# Patient Record
Sex: MALE | Race: OTHER | HISPANIC OR LATINO | URBAN - METROPOLITAN AREA
[De-identification: names, ages, dates, MRNs, and addresses within clinical notes are randomized per-mention and may not be internally consistent; named-entity substitution may affect disease eponyms.]

---

## 2020-05-07 ENCOUNTER — EMERGENCY (EMERGENCY)
Facility: HOSPITAL | Age: 20
LOS: 0 days | Discharge: OTHER INSTITUTION | End: 2020-05-08
Attending: EMERGENCY MEDICINE
Payer: COMMERCIAL

## 2020-05-07 VITALS
WEIGHT: 179.9 LBS | OXYGEN SATURATION: 98 % | HEART RATE: 89 BPM | RESPIRATION RATE: 20 BRPM | SYSTOLIC BLOOD PRESSURE: 169 MMHG | HEIGHT: 72 IN | TEMPERATURE: 99 F | DIASTOLIC BLOOD PRESSURE: 69 MMHG

## 2020-05-07 DIAGNOSIS — F32.89 OTHER SPECIFIED DEPRESSIVE EPISODES: ICD-10-CM

## 2020-05-07 DIAGNOSIS — Z81.1 FAMILY HISTORY OF ALCOHOL ABUSE AND DEPENDENCE: ICD-10-CM

## 2020-05-07 DIAGNOSIS — F12.90 CANNABIS USE, UNSPECIFIED, UNCOMPLICATED: ICD-10-CM

## 2020-05-07 DIAGNOSIS — T71.162A ASPHYXIATION DUE TO HANGING, INTENTIONAL SELF-HARM, INITIAL ENCOUNTER: ICD-10-CM

## 2020-05-07 DIAGNOSIS — Z11.59 ENCOUNTER FOR SCREENING FOR OTHER VIRAL DISEASES: ICD-10-CM
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
## 2020-05-07 DIAGNOSIS — J45.909 UNSPECIFIED ASTHMA, UNCOMPLICATED: ICD-10-CM
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
## 2020-05-07 DIAGNOSIS — S10.91XA ABRASION OF UNSPECIFIED PART OF NECK, INITIAL ENCOUNTER: ICD-10-CM

## 2020-05-07 LAB
ALBUMIN SERPL ELPH-MCNC: 4.1 G/DL — SIGNIFICANT CHANGE UP (ref 3.3–5)
ALP SERPL-CCNC: 62 U/L — SIGNIFICANT CHANGE UP (ref 40–120)
ALT FLD-CCNC: 30 U/L — SIGNIFICANT CHANGE UP (ref 12–78)
ANION GAP SERPL CALC-SCNC: 8 MMOL/L — SIGNIFICANT CHANGE UP (ref 5–17)
APAP SERPL-MCNC: < 2 UG/ML (ref 10–30)
AST SERPL-CCNC: 14 U/L — LOW (ref 15–37)
BASOPHILS # BLD AUTO: 0.07 K/UL — SIGNIFICANT CHANGE UP (ref 0–0.2)
BASOPHILS NFR BLD AUTO: 0.6 % — SIGNIFICANT CHANGE UP (ref 0–2)
BILIRUB SERPL-MCNC: 0.4 MG/DL — SIGNIFICANT CHANGE UP (ref 0.2–1.2)
BUN SERPL-MCNC: 12 MG/DL — SIGNIFICANT CHANGE UP (ref 7–23)
CALCIUM SERPL-MCNC: 9.2 MG/DL — SIGNIFICANT CHANGE UP (ref 8.5–10.1)
CHLORIDE SERPL-SCNC: 105 MMOL/L — SIGNIFICANT CHANGE UP (ref 96–108)
CO2 SERPL-SCNC: 23 MMOL/L — SIGNIFICANT CHANGE UP (ref 22–31)
CREAT SERPL-MCNC: 0.79 MG/DL — SIGNIFICANT CHANGE UP (ref 0.5–1.3)
EOSINOPHIL # BLD AUTO: 0.09 K/UL — SIGNIFICANT CHANGE UP (ref 0–0.5)
EOSINOPHIL NFR BLD AUTO: 0.8 % — SIGNIFICANT CHANGE UP (ref 0–6)
ETHANOL SERPL-MCNC: <10 MG/DL — SIGNIFICANT CHANGE UP (ref 0–10)
GLUCOSE SERPL-MCNC: 96 MG/DL — SIGNIFICANT CHANGE UP (ref 70–99)
HCT VFR BLD CALC: 41.1 % — SIGNIFICANT CHANGE UP (ref 39–50)
HGB BLD-MCNC: 13.9 G/DL — SIGNIFICANT CHANGE UP (ref 13–17)
IMM GRANULOCYTES NFR BLD AUTO: 0.2 % — SIGNIFICANT CHANGE UP (ref 0–1.5)
LYMPHOCYTES # BLD AUTO: 36 % — SIGNIFICANT CHANGE UP (ref 13–44)
LYMPHOCYTES # BLD AUTO: 4 K/UL — HIGH (ref 1–3.3)
MCHC RBC-ENTMCNC: 27.4 PG — SIGNIFICANT CHANGE UP (ref 27–34)
MCHC RBC-ENTMCNC: 33.8 GM/DL — SIGNIFICANT CHANGE UP (ref 32–36)
MCV RBC AUTO: 80.9 FL — SIGNIFICANT CHANGE UP (ref 80–100)
MONOCYTES # BLD AUTO: 0.55 K/UL — SIGNIFICANT CHANGE UP (ref 0–0.9)
MONOCYTES NFR BLD AUTO: 4.9 % — SIGNIFICANT CHANGE UP (ref 2–14)
NEUTROPHILS # BLD AUTO: 6.39 K/UL — SIGNIFICANT CHANGE UP (ref 1.8–7.4)
NEUTROPHILS NFR BLD AUTO: 57.5 % — SIGNIFICANT CHANGE UP (ref 43–77)
PLATELET # BLD AUTO: 268 K/UL — SIGNIFICANT CHANGE UP (ref 150–400)
POTASSIUM SERPL-MCNC: 3.6 MMOL/L — SIGNIFICANT CHANGE UP (ref 3.5–5.3)
POTASSIUM SERPL-SCNC: 3.6 MMOL/L — SIGNIFICANT CHANGE UP (ref 3.5–5.3)
PROT SERPL-MCNC: 7.8 GM/DL — SIGNIFICANT CHANGE UP (ref 6–8.3)
RBC # BLD: 5.08 M/UL — SIGNIFICANT CHANGE UP (ref 4.2–5.8)
RBC # FLD: 11.9 % — SIGNIFICANT CHANGE UP (ref 10.3–14.5)
SALICYLATES SERPL-MCNC: <1.7 MG/DL — LOW (ref 2.8–20)
SODIUM SERPL-SCNC: 136 MMOL/L — SIGNIFICANT CHANGE UP (ref 135–145)
WBC # BLD: 11.12 K/UL — HIGH (ref 3.8–10.5)
WBC # FLD AUTO: 11.12 K/UL — HIGH (ref 3.8–10.5)

## 2020-05-07 PROCEDURE — 99285 EMERGENCY DEPT VISIT HI MDM: CPT

## 2020-05-07 PROCEDURE — 36415 COLL VENOUS BLD VENIPUNCTURE: CPT

## 2020-05-07 PROCEDURE — 80307 DRUG TEST PRSMV CHEM ANLYZR: CPT

## 2020-05-07 PROCEDURE — 80053 COMPREHEN METABOLIC PANEL: CPT

## 2020-05-07 PROCEDURE — 85025 COMPLETE CBC W/AUTO DIFF WBC: CPT

## 2020-05-07 PROCEDURE — 87635 SARS-COV-2 COVID-19 AMP PRB: CPT

## 2020-05-07 PROCEDURE — 93005 ELECTROCARDIOGRAM TRACING: CPT

## 2020-05-07 PROCEDURE — 90792 PSYCH DIAG EVAL W/MED SRVCS: CPT | Mod: 95

## 2020-05-07 PROCEDURE — 93010 ELECTROCARDIOGRAM REPORT: CPT

## 2020-05-07 PROCEDURE — 81001 URINALYSIS AUTO W/SCOPE: CPT

## 2020-05-07 NOTE — ED ADULT TRIAGE NOTE - CHIEF COMPLAINT QUOTE
s/p suicide attempt. pt was found by family member hanging by two belts in garage. small, superficial abrasion to left side of neck. denies sob, dyspnea, pain. denies SI, HI. pt states "I feel relief she walked in."

## 2020-05-07 NOTE — ED ADULT NURSE NOTE - OBJECTIVE STATEMENT
pt presents to ED with attempted suicide. pt states that he has been battling with depression and having estranged relationships with family members and partner. pt states that tonight he just "snapped" he took 2 belts to hang himself in the garage, pt was found by family member. pt has small superficial abrasion to left side of neck. airway patent. a/o x 4, no distress noted.

## 2020-05-07 NOTE — ED PROVIDER NOTE - OBJECTIVE STATEMENT
18 y/o male with PMHx of asthma presents to the ED s/p +suicide attempt. Reports he has been feeling +depressed and he has had strange relationships with family, friends, and his partner worsening over last week. Tonight pt tried to kill himself hanging himself with 2 belts. Currently in ED, endorses +depression. Denies SI, HI, hallucinations, or fever. NKDA.

## 2020-05-07 NOTE — ED PROVIDER NOTE - PROGRESS NOTE DETAILS
AS:  pt received at shift change from Dr Burgos pending telepsych evaluation. Pt seen, consult appreciated.  PT tba involuntary status, but will need results of COVID testing prior to placement decisions can be made. Pt s/o to me.  Here with concerning story of attempted hanging.  Medically clear at this time.  Covid negative.  Stable for inpatient psych.  2 pc completed.  Part A completed.  Pending acceptance/placement.  Likely to [facility].  S/o to Dr. Robert. Pt medically  stable/cleared for admission to [facility]. Jakob GUZMAN

## 2020-05-07 NOTE — ED PROVIDER NOTE - PSYCHIATRIC, MLM
Alert and oriented to person, place, time/situation. +tearful, +endorses depression. Denies SI, HI, or hallucinations currently.

## 2020-05-08 VITALS
DIASTOLIC BLOOD PRESSURE: 83 MMHG | HEART RATE: 63 BPM | RESPIRATION RATE: 18 BRPM | OXYGEN SATURATION: 98 % | TEMPERATURE: 98 F | SYSTOLIC BLOOD PRESSURE: 135 MMHG

## 2020-05-08 DIAGNOSIS — F32.89 OTHER SPECIFIED DEPRESSIVE EPISODES: ICD-10-CM

## 2020-05-08 LAB — SARS-COV-2 RNA SPEC QL NAA+PROBE: SIGNIFICANT CHANGE UP

## 2020-05-08 RX ADMIN — Medication 0.5 MILLIGRAM(S): at 10:28
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
## 2020-05-08 NOTE — ED BEHAVIORAL HEALTH ASSESSMENT NOTE - PSYCHIATRIC ISSUES AND PLAN (INCLUDE STANDING AND PRN MEDICATION)
will defer to primary team. consider SSRI. ativan prn anxiety/agitation ativan prn anxiety or agitation. pending covid test, will obtain inpatient bed.
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
## 2020-05-08 NOTE — ED BEHAVIORAL HEALTH ASSESSMENT NOTE - NS ED BHA COCAINE
[address] [place] [address] faxed over the signed document over the dental office 
Clau from Dr Calloway Divers Clau a call at [phone]
None known

## 2020-05-08 NOTE — ED BEHAVIORAL HEALTH NOTE - BEHAVIORAL HEALTH NOTE
===================  PRE-HOSPITAL COURSE  ===================  SOURCE:  Second-hand information via EMR documentation and primary RN Katherine.  DETAILS:  Patient presented to the ED via EMS with no noted incidents.    ============  ED COURSE   ============  SOURCE:  Second-hand information via EMR documentation and primary RN Katherine.  ARRIVAL:  Patient presented to the ED via EMS with no noted incidents.  BELONGINGS:  Clothing; nothing of note in belongings.  BEHAVIOR: Complied with triage protocols - provided blood/urine, changed into a hospital gown, allowed staff to collect belongings without incident, denies SI, denies HI, is noted to be depressed with a sad affect, speech is at normal rate, normal volume, clear/audible, good articulation/tone, linear thought content, fair impulse control, fair insight/judgement, denies AVH/delusions, good eye contact, good hygiene/grooming, is AOX3, ate a sandwich, no aggression or behavioral issues reported.  TREATMENT:  No prn medications, no restraints, security interventions or manual hold required.  VISITORS:  Is unaccompanied by family or social supports.  ========================  COLLATERAL ATTEMPT  ========================  NAME: Tino  NUMBER: [phone]  RELATIONSHIP: Brother  COMMENTS: Made two attempts to contact brother at number provided with no success. Two voicemails were left requesting a phone call back.  ========================  COLLATERAL ATTEMPT  ========================  NAME: Edward  NUMBER: [phone]  RELATIONSHIP: Brother  RELIABILITY: Fair  COMMENTS: Infrequent contact    HPI:     Collateral confirms that at baseline patient has no psychiatric history but has been depressed and anxious since the COVID pandemic. He notes that patient works at a [employer] and business has been slow. Due to this reason, patient has been having relational issues with his fiance.  He notes that since having to quarantine in the home, tensions have increased between patient and his fiance mainly due to financial stressors. He relays that patient has been putting pressure on himself to financially provide for the family and has been depressed since he has been unable to work. Brother denies any ETOH/illicit drug use and attributes patients mood to financial stressors and relational discord. He is unable to provide any further details about what occurred prior to patients ED visit. No further HI or psychotic symptoms reported.

## 2020-05-08 NOTE — ED ADULT NURSE REASSESSMENT NOTE - NS ED NURSE REASSESS COMMENT FT1
Report received from day shift RN Jayne for continuity of care. VSS, pending transfer to [facility]. Patient is calm and cooperative, remorseful, belongings gathered for patient and in custody of 1:1 prior to transfer. Safety maintained, needs attended, will continue to monitor.

## 2020-05-08 NOTE — ED BEHAVIORAL HEALTH ASSESSMENT NOTE - ATTENTION / CONCENTRATION
Verified patient insurance active/eligible as of 01/01/13 under [employer]. Ref # [id number].  
Normal

## 2020-05-08 NOTE — ED ADULT NURSE REASSESSMENT NOTE - NS ED NURSE REASSESS COMMENT FT1
pt sleeping comfortably with rails up, 1:1 remains at bedside for safety. Pending covid result for involuntary admission.

## 2020-05-08 NOTE — ED BEHAVIORAL HEALTH ASSESSMENT NOTE - DESCRIPTION
see BH note none three brothers. lives with parents. employed at [employer]. has two year old daughter with his girlfirend.
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
## 2020-05-08 NOTE — ED ADULT NURSE REASSESSMENT NOTE - NS ED NURSE REASSESS COMMENT FT1
Pt A&O x 3, recvd pt from night RN in bed with rails up, 1;1 at bedside for safety. Pt offered breakfast and declined at this time. VSS, denies SI/HI at this time.

## 2020-09-25 NOTE — ED ADULT TRIAGE NOTE - HEART RATE (BEATS/MIN)
[place]lane Price Per Syringe: 500 Dysport Price Per Unit: 15 Notice: We have created a more complete Cosmetic Quote plan.  The procedure name is also Cosmetic Quote.  Please review the new plan and hide the Cosmetic Quote plan you do not want to use. Discount Percentage: 0 Detail Level: Simple 89

## 2020-10-20 ENCOUNTER — TRANSCRIPTION ENCOUNTER (OUTPATIENT)
Age: 20
End: 2020-10-20

## 2021-11-13 ENCOUNTER — EMERGENCY (EMERGENCY)
Facility: HOSPITAL | Age: 21
LOS: 0 days | Discharge: HOME | End: 2021-11-13
Attending: EMERGENCY MEDICINE
Payer: COMMERCIAL

## 2021-11-13 VITALS
OXYGEN SATURATION: 95 % | RESPIRATION RATE: 18 BRPM | DIASTOLIC BLOOD PRESSURE: 79 MMHG | TEMPERATURE: 99 F | HEART RATE: 83 BPM | SYSTOLIC BLOOD PRESSURE: 143 MMHG

## 2021-11-13 VITALS — HEIGHT: 72 IN | WEIGHT: 179.9 LBS

## 2021-11-13 DIAGNOSIS — R51.9 HEADACHE, UNSPECIFIED: ICD-10-CM

## 2021-11-13 DIAGNOSIS — R09.89 OTHER SPECIFIED SYMPTOMS AND SIGNS INVOLVING THE CIRCULATORY AND RESPIRATORY SYSTEMS: ICD-10-CM

## 2021-11-13 DIAGNOSIS — B34.9 VIRAL INFECTION, UNSPECIFIED: ICD-10-CM

## 2021-11-13 DIAGNOSIS — R09.81 NASAL CONGESTION: ICD-10-CM

## 2021-11-13 DIAGNOSIS — R05.9 COUGH, UNSPECIFIED: ICD-10-CM

## 2021-11-13 DIAGNOSIS — Z20.822 CONTACT WITH AND (SUSPECTED) EXPOSURE TO COVID-19: ICD-10-CM

## 2021-11-13 LAB
HPIV4 RNA SPEC QL NAA+PROBE: DETECTED
RAPID RVP RESULT: DETECTED
SARS-COV-2 RNA SPEC QL NAA+PROBE: SIGNIFICANT CHANGE UP

## 2021-11-13 PROCEDURE — 99283 EMERGENCY DEPT VISIT LOW MDM: CPT

## 2021-11-13 PROCEDURE — 99284 EMERGENCY DEPT VISIT MOD MDM: CPT

## 2021-11-13 PROCEDURE — 0225U NFCT DS DNA&RNA 21 SARSCOV2: CPT

## 2021-11-13 NOTE — ED STATDOCS - CLINICAL SUMMARY MEDICAL DECISION MAKING FREE TEXT BOX
Pt with viral URI, family with similar symptoms, normal sats, normal vitals, clear to auscultation, stable for outpatient follow up.

## 2021-11-13 NOTE — ED ADULT NURSE NOTE - NSFALLRSKASSESSTYPE_ED_ALL_ED
Initial (On Arrival) Cephalexin Pregnancy And Lactation Text: This medication is Pregnancy Category B and considered safe during pregnancy.  It is also excreted in breast milk but can be used safely for shorter doses.

## 2021-11-13 NOTE — ED STATDOCS - OBJECTIVE STATEMENT
22 y/o male with PMHx of asthma presents to the ED c/o cough x1 week. Pt also endorses headache, chills, and congestion. Pt went to Urgent Care and tested negative for COVID, flu, and common cold. Pt as put in albuterol, prednisone, Zpack on 11/11. 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 Pt reports they don't use mask at his job and is concerned for transmission. Pt report known sick contacts at home.

## 2021-11-13 NOTE — ED STATDOCS - PATIENT PORTAL LINK FT
You can access the FollowMyHealth Patient Portal offered by [facility] by registering at the following website: [email or web address]. By joining [facility]’s FollowMyHealth portal, you will also be able to view your health information using other applications (apps) compatible with our system.

## 2021-11-13 NOTE — ED ADULT NURSE NOTE - OBJECTIVE STATEMENT
pt. states he had cough that started this week, went to UC was -flu/-covid was given antibiotics and is improving. pt. 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

## 2021-11-13 NOTE — ED ADULT TRIAGE NOTE - CHIEF COMPLAINT QUOTE
pt. states he had cough that started this week, went to UC was -flu/-covid was given antibiotics and is improving. pt. want to know if what he has is contagious to other to return to work

## 2021-11-13 NOTE — ED STATDOCS - PROGRESS NOTE DETAILS
22 y/o M with PMH of asthma presents with cough/congestion x 1 week. pt was started on prednisone, albuterol, azithromycin and flonase and notes improvement in symptoms. Pt states he also tested negative for flu and covid recently. 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 Notes his coworkers do not wear masks while working. PE: Well appearing. Cardiac: s1s2, RRR. Lungs: CTAB. Abdomen: NBS x4, soft, nontender. A/p; Will check RVP, advised to continue meds as previously prescribed. Will dc home. - Jong Davis PA-C

## 2022-12-13 NOTE — ED ADULT NURSE NOTE - SUICIDE SCREENING QUESTION 2
Referred To Oculoplastics For Closure Text (Leave Blank If You Do Not Want): After obtaining clear surgical margins the patient was sent to oculoplastics for surgical repair.  The patient understands they will receive post-surgical care and follow-up from the referring physician's office. Yes

## 2023-06-23 NOTE — ED PROVIDER NOTE - NSBENEFITOFTRANSFER_ED_A_ED
Obtain Level of Care/Service Not Available at this Facility Tranexamic Acid Pregnancy And Lactation Text: It is unknown if this medication is safe during pregnancy or breast feeding.

## 2025-05-31 NOTE — ED ADULT NURSE NOTE - NS_BH TRG QUESTION5_ED_ALL_ED
Please schedule follow-up appointment with your primary care physician or your GYN provider for recheck of the area.    Take your medication as indicated and prescribed.  If you are given an antibiotic then, make sure you get the prescription filled and take the antibiotics until finished.  Drink plenty of water while taking the antibiotics.  Avoid drinking alcohol or drinks that have caffeine in it while taking antibiotics.       For pain use acetaminophen (Tylenol) or ibuprofen (Motrin / Advil), unless prescribed medications that have acetaminophen or ibuprofen (or similar medications) in it.  You can take over the counter acetaminophen tablets (1 - 2 tablets of the 500-mg strength every 6 hours) or ibuprofen tablets (2 tablets every 4 hours).    PLEASE RETURN TO THE EMERGENCY DEPARTMENT IMMEDIATELY for worsening symptoms, white drainage from the wound, redness or streaking, or if you develop any concerning symptoms such as: high fever not relieved by acetaminophen (Tylenol) and/or ibuprofen (Motrin / Advil), chills, shortness of breath, chest pain, feeling of your heart fluttering or racing, persistent nausea and/or vomiting, vomiting up blood, blood in your stool, loss of consciousness, numbness, weakness or tingling in the arms or legs or change in color of the extremities, changes in mental status, persistent headache, blurry vision, loss of bladder / bowel control, unable to follow up with your physician, or other any other care or concern.    
No